# Patient Record
Sex: MALE | Race: WHITE | URBAN - METROPOLITAN AREA
[De-identification: names, ages, dates, MRNs, and addresses within clinical notes are randomized per-mention and may not be internally consistent; named-entity substitution may affect disease eponyms.]

---

## 2017-04-01 ENCOUNTER — OFFICE VISIT (OUTPATIENT)
Dept: FAMILY MEDICINE CLINIC | Facility: CLINIC | Age: 32
End: 2017-04-01

## 2017-04-01 VITALS
HEART RATE: 110 BPM | TEMPERATURE: 100 F | OXYGEN SATURATION: 97 % | DIASTOLIC BLOOD PRESSURE: 70 MMHG | RESPIRATION RATE: 12 BRPM | HEIGHT: 73 IN | BODY MASS INDEX: 23.86 KG/M2 | WEIGHT: 180 LBS | SYSTOLIC BLOOD PRESSURE: 120 MMHG

## 2017-04-01 DIAGNOSIS — J01.90 ACUTE RHINOSINUSITIS: ICD-10-CM

## 2017-04-01 PROCEDURE — 99203 OFFICE O/P NEW LOW 30 MIN: CPT | Performed by: PHYSICIAN ASSISTANT

## 2017-04-01 RX ORDER — AMOXICILLIN AND CLAVULANATE POTASSIUM 875; 125 MG/1; MG/1
1 TABLET, FILM COATED ORAL 2 TIMES DAILY
Qty: 20 TABLET | Refills: 0 | Status: SHIPPED | OUTPATIENT
Start: 2017-04-01 | End: 2017-04-11

## 2017-04-01 NOTE — PATIENT INSTRUCTIONS
Please follow up with your PCP if no improvement within 5-7 days. Go directly to the ER for any acute worsening of symptoms.    RX for Recovery from Your Viral Upper Respiratory Illness  Prepared for: Ke Izaguirre  Date: 4/1/2017    Take the medications(s) a Chlorpheniramine (Chlor-Trimeton, Coricidin HBP): Take for the next 3-5 days; follow the package directions especially if you have high blood pressure. Avoid Sudafed products if you have high blood pressure.     For overall congestion relief:  Drink more

## 2017-04-01 NOTE — PROGRESS NOTES
CHIEF COMPLAINT:   Patient presents with:  Sinus Problem: x 1 day, low grade fever, jaw pain, sore throat, some BA and chills    HPI:   Michael Manjarrez is a 33year old male who presents for sinus symptoms for x 1 day.    Patient reports sore throat, nasal jasen GENERAL: well developed, well nourished,in no apparent distress  SKIN: no rashes,no suspicious lesions  HEAD: atraumatic, normocephalic.     SINUSES: +maxillary sinus tenderness, no frontal sinus tenderness  EYES: conjunctiva clear, EOM intact  EARS:    Rig Comfort care as described in Patient Instructions below. Adele Bloch verbalized understanding of these issues and agreed to the plan. They are asked to follow up with PCP if sx's persist or worsen.     Patient Instructions   Please follow up with your PCP Saline Nasal spray:  Follow the package directions to help clear your nose and make it feel less dry. For sneezing, watery/itchy eyes, runny nose:   Chlorpheniramine (Chlor-Trimeton, Coricidin HBP):   Take for the next 3-5 days; follow the package dire

## 2017-11-22 ENCOUNTER — OFFICE VISIT (OUTPATIENT)
Dept: FAMILY MEDICINE CLINIC | Facility: CLINIC | Age: 32
End: 2017-11-22

## 2017-11-22 VITALS
HEIGHT: 74 IN | SYSTOLIC BLOOD PRESSURE: 120 MMHG | TEMPERATURE: 98 F | DIASTOLIC BLOOD PRESSURE: 70 MMHG | HEART RATE: 80 BPM | OXYGEN SATURATION: 98 % | RESPIRATION RATE: 20 BRPM | BODY MASS INDEX: 24.38 KG/M2 | WEIGHT: 190 LBS

## 2017-11-22 DIAGNOSIS — J01.00 ACUTE MAXILLARY SINUSITIS, RECURRENCE NOT SPECIFIED: Primary | ICD-10-CM

## 2017-11-22 PROCEDURE — 99213 OFFICE O/P EST LOW 20 MIN: CPT | Performed by: NURSE PRACTITIONER

## 2017-11-22 RX ORDER — AMOXICILLIN 875 MG/1
875 TABLET, COATED ORAL 2 TIMES DAILY
Qty: 20 TABLET | Refills: 0 | Status: SHIPPED | OUTPATIENT
Start: 2017-11-22 | End: 2017-12-02

## 2017-11-22 NOTE — PROGRESS NOTES
CHIEF COMPLAINT:   Patient presents with:  Sinus Problem      HPI:   Chip Rios is a 27year old male who presents for cold symptoms for  3  weeks. Symptoms have progressed into sinus congestion and been worsening since onset.  Sinus congestion/pain is reinaldo wheezes or rhonchi. Breathing is non labored. CARDIO: RRR without murmur  EXTREMITIES: no cyanosis, clubbing or edema  LYMPH:  No lymphadenopathy.         ASSESSMENT AND PLAN:   David Washington is a 27year old male who presents with Patient presents with:  Si

## 2017-11-25 ENCOUNTER — TELEPHONE (OUTPATIENT)
Dept: FAMILY MEDICINE CLINIC | Facility: CLINIC | Age: 32
End: 2017-11-25

## 2017-11-25 DIAGNOSIS — J01.00 ACUTE NON-RECURRENT MAXILLARY SINUSITIS: Primary | ICD-10-CM

## 2017-11-25 RX ORDER — CEFDINIR 300 MG/1
300 CAPSULE ORAL 2 TIMES DAILY
Qty: 20 CAPSULE | Refills: 0 | Status: SHIPPED | OUTPATIENT
Start: 2017-11-25 | End: 2017-12-05

## 2017-11-25 NOTE — TELEPHONE ENCOUNTER
Follow up with PCP if not improving in 2 days. Strongly recommend following up with an allergist/ENT as he has a past history of recurrent sinusitis, allergies - including allergy desensitizing shots.  Go to IC if symptoms worsen

## (undated) NOTE — MR AVS SNAPSHOT
[facility] [address]  [address] [place] [address] Road  [phone]               Thank you for choosing us for your health care visit with Ravi Hamm PA-C.   We are glad to serve you and happy to provide you with this summary of you for the next 5-7 days:]  Two tablets every 12 hours for the first four doses, then one tablet every 12 hours. Warm mist vaporizer with camphor (Vicks VapoSteam).     For nose and sinus congestion:   Pseudoephedrine (Sudafed 4- or 12-hour tablets, ask for i Don't let anyone use a dish or utensil you've used until it's been thoroughly washed with hot soapy water.              Allergies as of Apr 01, 2017     Sulfa Antibiotics                 Today's Vital Signs     BP Pulse Temp Height Weight BMI    120/70 mmHg